# Patient Record
Sex: FEMALE | Race: WHITE | NOT HISPANIC OR LATINO | RURAL
[De-identification: names, ages, dates, MRNs, and addresses within clinical notes are randomized per-mention and may not be internally consistent; named-entity substitution may affect disease eponyms.]

---

## 2019-12-12 ENCOUNTER — APPOINTMENT (RX ONLY)
Dept: RURAL CLINIC 3 | Facility: CLINIC | Age: 59
Setting detail: DERMATOLOGY
End: 2019-12-12

## 2019-12-12 DIAGNOSIS — L30.9 DERMATITIS, UNSPECIFIED: ICD-10-CM

## 2019-12-12 DIAGNOSIS — L85.3 XEROSIS CUTIS: ICD-10-CM

## 2019-12-12 PROCEDURE — 11104 PUNCH BX SKIN SINGLE LESION: CPT

## 2019-12-12 PROCEDURE — ? OTHER

## 2019-12-12 PROCEDURE — ? REFERRAL CORRESPONDENCE

## 2019-12-12 PROCEDURE — ? COUNSELING

## 2019-12-12 PROCEDURE — 99202 OFFICE O/P NEW SF 15 MIN: CPT | Mod: 25

## 2019-12-12 PROCEDURE — ? PATIENT SPECIFIC COUNSELING

## 2019-12-12 PROCEDURE — ? TREATMENT REGIMEN

## 2019-12-12 PROCEDURE — ? BIOPSY BY PUNCH METHOD

## 2019-12-12 ASSESSMENT — LOCATION DETAILED DESCRIPTION DERM
LOCATION DETAILED: LEFT ANTERIOR DISTAL THIGH
LOCATION DETAILED: LEFT LATERAL SUPERIOR CHEST
LOCATION DETAILED: RIGHT MEDIAL BREAST 4-5:00 REGION
LOCATION DETAILED: LEFT DISTAL POSTERIOR THIGH
LOCATION DETAILED: LEFT MEDIAL BREAST 7-8:00 REGION
LOCATION DETAILED: LEFT SUPERIOR MEDIAL UPPER BACK

## 2019-12-12 ASSESSMENT — LOCATION SIMPLE DESCRIPTION DERM
LOCATION SIMPLE: LEFT BREAST
LOCATION SIMPLE: LEFT UPPER BACK
LOCATION SIMPLE: CHEST
LOCATION SIMPLE: RIGHT BREAST
LOCATION SIMPLE: LEFT THIGH
LOCATION SIMPLE: LEFT POSTERIOR THIGH

## 2019-12-12 ASSESSMENT — LOCATION ZONE DERM
LOCATION ZONE: TRUNK
LOCATION ZONE: LEG

## 2019-12-12 NOTE — PROCEDURE: PATIENT SPECIFIC COUNSELING
Detail Level: Zone
Patient’s biopsy results are anticipated to come in during Christmas week when we are closed (except for limited nursing). Patient will be scheduled for follow-up in January. She should take antihistamines until her follow-up, if her rash worsens, she was instructed to notify the clinic.

## 2019-12-12 NOTE — PROCEDURE: TREATMENT REGIMEN
Detail Level: Detailed
Discontinue Regimen: Fluconazole, hydrocortisone cream, ketoconazole cream, all other topicals
Initiate Treatment: Antihistamines: 2 non-drowsy Zyrtec PO qam, 2 non-drowsy Allegra PO in the afternoon, 2 Benadryl PO qhs. Patient may substitute 2 non-drowsy Claritin for the Zyrtec or Allegra if she desires

## 2019-12-12 NOTE — PROCEDURE: OTHER
Other (Free Text): Two specimens were taken from the same area and placed into one specimen container. Each site was closed with one 4-0 nylon suture.
Detail Level: Detailed
Note Text (......Xxx Chief Complaint.): This diagnosis correlates with the

## 2019-12-12 NOTE — PROCEDURE: BIOPSY BY PUNCH METHOD
X Size Of Lesion In Cm (Optional): 0
Hemostasis: None
Epidermal Sutures: 4-0 Nylon
Was A Bandage Applied: Yes
Home Suture Removal Text: Patient was provided a home suture removal kit and will remove their sutures at home.  If they have any questions or difficulties they will call the office.
Consent: Verbal consent was obtained from patient.
Bill For Surgical Tray: no
Wound Care: Polysporin ointment
Notification Instructions: Patient will be notified of biopsy results. However, patient instructed to call the office if not contacted within 2 weeks.
Suture Removal: 12 days
Punch Size In Mm: 4
Billing Type: Third-Party Bill
Path Notes (To The Dermatopathologist): Patient’s last use of hydrocortisone 2.5% cream 1:1 ketoconazole cream was last night. She used it x  10 days
Anesthesia Type: 1% lidocaine with epinephrine
Post-Care Instructions: I reviewed with the patient in detail post-care instructions. Patient is to keep the biopsy site dry overnight, and then apply bacitracin twice daily until healed. Patient may apply hydrogen peroxide soaks to remove any crusting.
Biopsy Type: H and E
Dressing: bandage
Detail Level: Detailed
Anesthesia Volume In Cc: 1

## 2019-12-19 ENCOUNTER — RX ONLY (OUTPATIENT)
Age: 59
Setting detail: RX ONLY
End: 2019-12-19

## 2019-12-19 RX ORDER — TRIAMCINOLONE ACETONIDE 1 MG/G
OINTMENT TOPICAL
Qty: 1 | Refills: 1 | Status: ERX | COMMUNITY
Start: 2019-12-19

## 2020-01-15 ENCOUNTER — APPOINTMENT (RX ONLY)
Dept: RURAL CLINIC 3 | Facility: CLINIC | Age: 60
Setting detail: DERMATOLOGY
End: 2020-01-15

## 2020-01-15 DIAGNOSIS — L259 CONTACT DERMATITIS AND OTHER ECZEMA, UNSPECIFIED CAUSE: ICD-10-CM

## 2020-01-15 PROBLEM — L23.9 ALLERGIC CONTACT DERMATITIS, UNSPECIFIED CAUSE: Status: ACTIVE | Noted: 2020-01-15

## 2020-01-15 PROCEDURE — 99213 OFFICE O/P EST LOW 20 MIN: CPT

## 2020-01-15 PROCEDURE — ? REFERRAL

## 2020-01-15 PROCEDURE — ? TREATMENT REGIMEN

## 2020-01-15 PROCEDURE — ? COUNSELING

## 2020-01-15 ASSESSMENT — LOCATION DETAILED DESCRIPTION DERM
LOCATION DETAILED: LOWER STERNUM
LOCATION DETAILED: LEFT MEDIAL BREAST 7-8:00 REGION
LOCATION DETAILED: RIGHT MEDIAL BREAST 4-5:00 REGION

## 2020-01-15 ASSESSMENT — LOCATION SIMPLE DESCRIPTION DERM
LOCATION SIMPLE: CHEST
LOCATION SIMPLE: LEFT BREAST
LOCATION SIMPLE: RIGHT BREAST

## 2020-01-15 ASSESSMENT — LOCATION ZONE DERM: LOCATION ZONE: TRUNK

## 2020-01-15 NOTE — PROCEDURE: TREATMENT REGIMEN
Modify Regimen: Reduce antihistamines from 2 non-drowsy Zyrtec PO qam to 1, from 2 non-drowsy Allegra PO in the afternoon to 1, and from 2 Benadryl PO qhs to 1. Patient should continue this regimen until her patch testing consult with IAN Healy and take further directions from her. If patient flares prior to this consult, she should increase dosage back to 2/2/2. Patient educated to decrease triamcinolone to twice weekly
Samples Given: FREE & CLEAR: Shampoo, Conditioner\\nVANICREAM: Gentle Body Wash, Skin Cream, product list
Detail Level: Detailed

## 2020-02-13 ENCOUNTER — APPOINTMENT (RX ONLY)
Dept: RURAL CLINIC 3 | Facility: CLINIC | Age: 60
Setting detail: DERMATOLOGY
End: 2020-02-13

## 2020-02-13 DIAGNOSIS — L259 CONTACT DERMATITIS AND OTHER ECZEMA, UNSPECIFIED CAUSE: ICD-10-CM

## 2020-02-13 PROBLEM — L23.9 ALLERGIC CONTACT DERMATITIS, UNSPECIFIED CAUSE: Status: ACTIVE | Noted: 2020-02-13

## 2020-02-13 PROCEDURE — 99212 OFFICE O/P EST SF 10 MIN: CPT

## 2020-02-13 PROCEDURE — ? ORDER TESTS

## 2020-02-13 PROCEDURE — ? OTHER

## 2020-02-13 PROCEDURE — ? COUNSELING

## 2020-02-13 ASSESSMENT — LOCATION ZONE DERM: LOCATION ZONE: TRUNK

## 2020-02-13 ASSESSMENT — LOCATION DETAILED DESCRIPTION DERM: LOCATION DETAILED: RIGHT MEDIAL BREAST 4-5:00 REGION

## 2020-02-13 ASSESSMENT — LOCATION SIMPLE DESCRIPTION DERM: LOCATION SIMPLE: RIGHT BREAST

## 2020-02-13 NOTE — PROCEDURE: COUNSELING
Patient Specific Counseling (Will Not Stick From Patient To Patient): Patient is flaring on her right breast. Recommend she increase triamcinolone to bid x 2 weeks then reduce to twice weekly. Discussed another biopsy, patient declines. \\nPatient has a patch of small erythematous papules (no pustules) in between her breasts. She has multiple topical products at home but is unsure if she has hydrocortisone. Secondary to patient’s history of using multiple topicals and the need to know what she is using, recommend she use triamcinolone ointment 1:1 ketoconazole cream in this area for up to one week, then discontinue.
Detail Level: Simple

## 2020-02-13 NOTE — PROCEDURE: OTHER
Note Text (......Xxx Chief Complaint.): This diagnosis correlates with the
Detail Level: Detailed
Other (Free Text): Will send today’s chart note to IAN Healy.

## 2024-06-15 NOTE — PROCEDURE: COUNSELING
Patient Specific Counseling (Will Not Stick From Patient To Patient): Reviewed patient’s pathology which had numerous eosinophils and is consistent with an allergic contact dermatitis. Clinically, this also fits. Patient may still have an urticarial component to her rash as she she remains mildly dermatographic with multiple antihistamines on board. Patient is eager to reduce antihistamines so we will try a taper. If she flares, she should increase them. \\n\\nPatient wonders why her abdominal rash cleared previously within a matter of days with treatment by her primary care provider. Discussed that it is possible that she had two different rashes. Also discussed that it is possible that she when she used Lamisil cream, she was no longer using whatever product she may have had an allergy to (patient favors two different rashes).\\n\\nRecommend patch testing. Patient is agreeable to this. Will refer her to IAN Healy.
Detail Level: Zone
Female